# Patient Record
Sex: FEMALE | Race: WHITE | Employment: OTHER | URBAN - METROPOLITAN AREA
[De-identification: names, ages, dates, MRNs, and addresses within clinical notes are randomized per-mention and may not be internally consistent; named-entity substitution may affect disease eponyms.]

---

## 2020-11-18 ENCOUNTER — APPOINTMENT (OUTPATIENT)
Dept: CT IMAGING | Age: 85
End: 2020-11-18
Payer: MEDICARE

## 2020-11-18 ENCOUNTER — APPOINTMENT (OUTPATIENT)
Dept: GENERAL RADIOLOGY | Age: 85
End: 2020-11-18
Payer: MEDICARE

## 2020-11-18 ENCOUNTER — HOSPITAL ENCOUNTER (EMERGENCY)
Age: 85
Discharge: HOME | End: 2020-11-18
Attending: EMERGENCY MEDICINE
Payer: MEDICARE

## 2020-11-18 VITALS
OXYGEN SATURATION: 95 % | DIASTOLIC BLOOD PRESSURE: 70 MMHG | HEART RATE: 94 BPM | TEMPERATURE: 97.7 F | RESPIRATION RATE: 18 BRPM | SYSTOLIC BLOOD PRESSURE: 143 MMHG

## 2020-11-18 PROCEDURE — 72192 CT PELVIS W/O DYE: CPT

## 2020-11-18 PROCEDURE — 73502 X-RAY EXAM HIP UNI 2-3 VIEWS: CPT

## 2020-11-18 PROCEDURE — 73610 X-RAY EXAM OF ANKLE: CPT

## 2020-11-18 PROCEDURE — 99283 EMERGENCY DEPT VISIT LOW MDM: CPT

## 2020-11-18 RX ORDER — ATORVASTATIN CALCIUM 80 MG/1
TABLET, FILM COATED ORAL
COMMUNITY

## 2020-11-18 RX ORDER — FOLIC ACID 1 MG/1
TABLET ORAL 2 TIMES DAILY
COMMUNITY

## 2020-11-18 RX ORDER — SPIRONOLACTONE 25 MG/1
TABLET ORAL
COMMUNITY

## 2020-11-18 RX ORDER — FUROSEMIDE 40 MG/1
TABLET ORAL
COMMUNITY

## 2020-11-18 RX ORDER — LEVOTHYROXINE SODIUM 88 UG/1
TABLET ORAL
COMMUNITY

## 2020-11-18 RX ORDER — METOPROLOL SUCCINATE 25 MG/1
25 TABLET, EXTENDED RELEASE ORAL DAILY
COMMUNITY

## 2020-11-18 RX ORDER — TRAMADOL HYDROCHLORIDE 50 MG/1
50-100 TABLET ORAL EVERY 8 HOURS PRN
Qty: 12 TABLET | Refills: 0 | Status: SHIPPED | OUTPATIENT
Start: 2020-11-18 | End: 2020-11-23

## 2020-11-19 NOTE — ED NOTES
Patient states she was getting out of elevator and rolled her right ankle. Patient states she did not fall and denied any dizziness or lightheadedness. Patient reports pain in her right groin after event. Denies any pain in her right ankle. Right leg is warm, pink, and no external signs of injury noted. Pulses are present. +1 edema noted to bilateral lower extremities. Respiratory assessment WNL. Patient denies any numbness or tingling. Cap refills in RLE less than 3 seconds. Patient is alert and oriented and able to follow commands.       Tomás Dominique RN  11/18/20 3711

## 2020-11-19 NOTE — ED PROVIDER NOTES
[id number] [facility] ED  [address] [facility] [address]  Phone: [phone]  Fax: [phone]      eMERGENCY dEPARTMENT eNCOUnter      Pt Name: Caleb Cormier  MRN: [id number]  [place] 1935  Date of evaluation: 11/18/20      CHIEF COMPLAINT:  Chief Complaint   Patient presents with    Groin Pain     RIGHT       HISTORY OF PRESENT ILLNESS    Caleb Cormier is a 82 y.o. female who presents with evaluation for orthopedic pain:    Location/Symptom:   R hip and ankle pain  Timing/Onset:  3 hrs  Context/Setting:    Pt was in elevator and twisted her right ankle, causing her to buckle a little and she caught herself on the elevator hand rail. No fall. She reports initial right ankle pain and some milder hip pain. Ankle pain much better but right hip/thigh pain persistent and increasing pain with weightbearing. No paresthesias/focal weakness. No associated bl-b incont/abd pain. No orthopedic history of this leg reported. She took some Tylenol earlier prior to arrival.     Quality:   Achy, sharp  Duration:   constant  Modifying Factors: Worse with movement and weightbearing, better with rest  Severity:   Moderate    Nursing Notes were reviewed. REVIEW OF SYSTEMS       Constitutional: Denies recent fever, chills. Eyes: No vision changes. Neck: No neck pain. Respiratory: Denies recent shortness of breath. Cardiac:  Denies recent chest pain. GI:  Denies abdominal pain/nausea/vomiting/diarrhea. : Denies dysuria. Musculoskeletal:   Per HPI  Neurologic:  No headache. No focal weakness. No paresthesias. Skin:  Denies any rash. Negative in 10 essential Systems except as mentioned above and in the HPI. PAST MEDICAL HISTORY   PMH:  has a past medical history of Heart attack ([address] and Hypothyroidism. Surgical History:  has a past surgical history that includes Cardiac surgery; fracture surgery (Left); and Hysterectomy. Social History:  reports that she has never smoked.  She Tylenol a few hrs ago. Suspect more muscular sprain/strain but getting some XR. Normal active/passive ROM of hip w/o significant findings of concern. 1950  Getting CT of Pelvis as we have suspicion of right rami fracture on XR. Pt agreeable. 2038   Attending and I have reviewed the disposition diagnosis with the patient and or their family/guardian. Pelvic fractures discussed and NWB status reiterated. Family is trying to secure a walker tonight for her to use. She is an established [facility] patient so they will f/u with that office tomorrow for scheduling. We will provide Ultram rx as she really doesn't want to take anything other than Tylenol for her pain. We have answered their questions and given discharge instructions. They voiced understanding of these instructions and did not have any further questions or complaints. Attending is on phone right now discussing her injuries with her son who is a Physician at [facility]. Attending asked if we can send images over to [facility] for his review. Discharges    Orders Placed This Encounter   Medications    traMADol (ULTRAM) 50 MG tablet     Sig: Take 1-2 tablets by mouth every 8 hours as needed for Pain for up to 5 days. Dispense:  12 tablet     Refill:  0       CONSULTS:  None      FINAL IMPRESSION      1. Multiple closed fractures of pelvis with disruption of pelvic ring, initial encounter [facility])          DISPOSITION/PLAN:  DISPOSITION          PATIENT REFERRED TO:  [address]  [phone]    Schedule an appointment as soon as possible for a visit   For fracture management     [facility]  [address] [place] [address]  [phone]  Go to   for worsening of your symptoms      DISCHARGE MEDICATIONS:  New Prescriptions    TRAMADOL (ULTRAM) 50 MG TABLET    Take 1-2 tablets by mouth every 8 hours as needed for Pain for up to 5 days.        (Please note that portions of this note were completed with a voice recognition program.  Efforts were made to edit the dictations but occasionally words are mis-transcribed.)    DEBBIE Ayala PA-C  11/18/20 2048

## 2020-11-19 NOTE — ED PROVIDER NOTES
[id number] [facility] ED  [address] [facility] [address]  Phone: [phone]  Fax: [phone]      Attending Physician Attestation    I performed a history and physical examination of the patient and discussed management with the mid level provider. I reviewed the mid level provider's note and agree with the documented findings and plan of care. Any areas of disagreement are noted on the chart. I was personally present for the key portions of any procedures. I have documented in the chart those procedures where I was not present during the key portions. I have reviewed the emergency nurses triage note. I agree with the chief complaint, past medical history, past surgical history, allergies, medications, social and family history as documented unless otherwise noted below. Documentation of the HPI, Physical Exam and Medical Decision Making performed by mid level providers is based on my personal performance of the HPI, PE and MDM. For Physician Assistant/ Nurse Practitioner cases/documentation I have personally evaluated this patient and have completed at least one if not all key elements of the E/M (history, physical exam, and MDM). Additional findings are as noted. CHIEF COMPLAINT       Chief Complaint   Patient presents with    Groin Pain     RIGHT         HISTORY OF PRESENT ILLNESS    Sarah Frey is a 82 y.o. female who presents who twisted her right ankle and then noted pelvic pain. Dates the pain has been localized with weightbearing and to the right groin region. No direct fall is noted. Distal neurovascular complaints      PAST MEDICAL HISTORY    has a past medical history of Heart attack ([address] and Hypothyroidism. SURGICAL HISTORY      has a past surgical history that includes Cardiac surgery; fracture surgery (Left); and Hysterectomy.     CURRENT MEDICATIONS       Previous Medications    ATORVASTATIN (LIPITOR) 80 MG TABLET    1 tablet    FOLIC ACID (FOLVITE) 1 MG TABLET TAKE 1 TABLET BY MOUTH TWICE DAILY    FOLIC ACID (FOLVITE) 1 MG TABLET    2 times daily    FUROSEMIDE (LASIX) 40 MG TABLET    1 tablet    LEVOTHYROXINE (SYNTHROID) 88 MCG TABLET    1 tablet in the morning on an empty stomach    METOPROLOL SUCCINATE (TOPROL XL) 25 MG EXTENDED RELEASE TABLET    Take 25 mg by mouth daily    SPIRONOLACTONE (ALDACTONE) 25 MG TABLET    1/2 tab po daily       ALLERGIES     is allergic to pcn [penicillins] and shellfish-derived products. FAMILY HISTORY     has no family status information on file. family history is not on file. SOCIAL HISTORY      reports that she has never smoked. She has never used smokeless tobacco.    PHYSICAL EXAM     INITIAL VITALS:  oral temperature is 97.7 °F (36.5 °C). Her blood pressure is 143/70 (abnormal) and her pulse is 94. Her respiration is 18 and oxygen saturation is 95%. There is some tenderness with range of motion of the right hip no visible signs of trauma or shortening or external rotation the ankle appears to be intact. DIAGNOSTIC RESULTS     EKG: All EKG's are interpreted by the Emergency Department Physician who either signs or Co-signs this chart in the absence of a cardiologist.        RADIOLOGY:   Non-plain film images such as CT, Ultrasound and MRI are read by the radiologist. [facility] radiographic images are visualized and the radiologist interpretations are reviewed as follows:     Pelvic fracture on the right side x2    LABS:  No results found for this visit on 11/18/20.         EMERGENCY DEPARTMENT COURSE:   Vitals:    Vitals:    11/18/20 1842 11/18/20 2009   BP: (!) 134/56 (!) 143/70   Pulse: 76 94   Resp: 16 18   Temp: 97.7 °F (36.5 °C)    TempSrc: Oral    SpO2: 94% 95%     -------------------------  BP: (!) 143/70, Temp: 97.7 °F (36.5 °C), Pulse: 94, Resp: 18      PERTINENT ATTENDING PHYSICIAN COMMENTS:          (Please note that portions of this note were completed with a voice recognition program.  Efforts were made to

## 2024-01-11 ENCOUNTER — APPOINTMENT (OUTPATIENT)
Dept: VASCULAR LAB | Age: 89
End: 2024-01-11
Attending: NURSE PRACTITIONER
Payer: MEDICARE

## 2024-01-11 ENCOUNTER — APPOINTMENT (OUTPATIENT)
Dept: GENERAL RADIOLOGY | Age: 89
End: 2024-01-11
Payer: MEDICARE

## 2024-01-11 ENCOUNTER — HOSPITAL ENCOUNTER (EMERGENCY)
Age: 89
Discharge: HOME | End: 2024-01-11
Attending: STUDENT IN AN ORGANIZED HEALTH CARE EDUCATION/TRAINING PROGRAM
Payer: MEDICARE

## 2024-01-11 VITALS
SYSTOLIC BLOOD PRESSURE: 145 MMHG | HEART RATE: 77 BPM | TEMPERATURE: 97.6 F | WEIGHT: 152 LBS | RESPIRATION RATE: 16 BRPM | BODY MASS INDEX: 34.19 KG/M2 | OXYGEN SATURATION: 98 % | HEIGHT: 56 IN | DIASTOLIC BLOOD PRESSURE: 55 MMHG

## 2024-01-11 DIAGNOSIS — S92.351A CLOSED DISPLACED FRACTURE OF FIFTH METATARSAL BONE OF RIGHT FOOT, INITIAL ENCOUNTER: Primary | ICD-10-CM

## 2024-01-11 LAB — ECHO BSA: 1.65 M2

## 2024-01-11 PROCEDURE — 73630 X-RAY EXAM OF FOOT: CPT

## 2024-01-11 PROCEDURE — 99284 EMERGENCY DEPT VISIT MOD MDM: CPT

## 2024-01-11 PROCEDURE — 73610 X-RAY EXAM OF ANKLE: CPT

## 2024-01-11 PROCEDURE — 93971 EXTREMITY STUDY: CPT | Performed by: SURGERY

## 2024-01-11 PROCEDURE — 93971 EXTREMITY STUDY: CPT

## 2024-01-11 RX ORDER — LEVOCETIRIZINE DIHYDROCHLORIDE 5 MG/1
5 TABLET, FILM COATED ORAL NIGHTLY
COMMUNITY

## 2024-01-11 ASSESSMENT — PAIN - FUNCTIONAL ASSESSMENT: PAIN_FUNCTIONAL_ASSESSMENT: NONE - DENIES PAIN

## 2024-01-11 ASSESSMENT — ENCOUNTER SYMPTOMS
ABDOMINAL PAIN: 0
SORE THROAT: 0
COUGH: 0
NAUSEA: 0
SHORTNESS OF BREATH: 0
BACK PAIN: 0
DIARRHEA: 0
VOMITING: 0

## 2024-01-11 NOTE — ED PROVIDER NOTES
interpreted by the Emergency Department Physician who either signs or Co-signs this chart in the absence of a cardiologist.        RADIOLOGY:   Non-plain film images such as CT, Ultrasound and MRI are read by the radiologist. Plain radiographic images are visualized and preliminarily interpreted by the emergency physician with the below findings:        Interpretation per the Radiologist below, if available at the time of this note:    No orders to display         ED BEDSIDE ULTRASOUND:   Performed by ED Physician - none    LABS:  Labs Reviewed - No data to display    All other labs were within normal range or not returned as of this dictation.    EMERGENCY DEPARTMENT COURSE and DIFFERENTIAL DIAGNOSIS/MDM:   Vitals:    Vitals:    01/11/24 1537   BP: (!) 145/55   Pulse: 77   Resp: 16   Temp: 97.6 °F (36.4 °C)   TempSrc: Oral   SpO2: 98%   Weight: 68.9 kg (152 lb)   Height: 1.422 m (4' 8\")           Medical Decision Making  This is a nontoxic-appearing 88-year-old female presenting with female family member for evaluation of right lower extremity ankle foot pain swelling, on December 17, 2023 the patient reports that she was pulling a Pan Out Of the Oven It Was Not Hot, the Pan Dropped on Her Foot, She Said the Pain Has Mostly Improved but They Were Concerned Because of the Swelling Persisting for Possible DVT Prompting Him to Come to the Emergency Department for Evaluation.  The Patient Is Not Anticoagulated No Previous History of DVTs or PEs.    Patient was offered analgesic medication while in the department, declined politely states that the pain is not that bad they just want to figure out what is causing the swelling.    Right lower extremity venous Doppler was ordered, right foot and right ankle x-ray ordered.    Amount and/or Complexity of Data Reviewed  Radiology: ordered.            REASSESSMENT          CRITICAL CARE TIME   Total Critical Care time was  minutes, excluding separately reportable

## 2024-01-11 NOTE — ED PROVIDER NOTES
[facility] EMERGENCY DEPARTMENT  EMERGENCY DEPARTMENT ENCOUNTER  INDEPENDENT ATTESTATION         1. Closed displaced fracture of fifth metatarsal bone of right foot, initial encounter          Pt Name: Catia Hayes  MRN: [id number]  Birthdate 1935  Date of evaluation: 1/15/24    Catia Hayes is a 88 y.o. female who presents with Ankle Pain (Pt arrives with co ankle injury approx 1 mth ago. Pt states she dropped an object on it and was all black and blue and swollen. Pt has not been evaluated for the injury )  .    Based on the medical record, the care appears appropriate. I was personally available for consultation in the Emergency Department.      FINAL IMPRESSION      1. Closed displaced fracture of fifth metatarsal bone of right foot, initial encounter          DISPOSITION / PLAN     DISPOSITION Decision To Discharge 01/11/2024 05:49:54 PM      PATIENT REFERRED TO:  Daniel Moraes DPM  [address] [address]  [phone]    Schedule an appointment as soon as possible for a visit   Update that you were in the ER at [place], call and schedule appointment for follow up      DISCHARGE MEDICATIONS:  Discharge Medication List as of 1/11/2024  5:54 PM          Dr. Baylee Hendricks,    Attending Emergency Physician       Baylee Hendricks DO  01/15/24 0901     Medications:  Prior to Admission medications    Medication Sig Start Date End Date Taking? Authorizing Provider   levocetirizine (XYZAL) 5 MG tablet Take 1 tablet by mouth nightly   Yes Negin Sepulveda MD   levothyroxine (SYNTHROID) 88 MCG tablet 1 tablet in the morning on an empty stomach    Negin Sepulveda MD   spironolactone (ALDACTONE) 25 MG tablet 1/2 tab po daily    Negin Sepulveda MD   furosemide (LASIX) 40 MG tablet 1 tablet    Negin Sepulveda MD   atorvastatin (LIPITOR) 80 MG tablet 1 tablet    Negin Sepulveda MD   metoprolol succinate (TOPROL XL) 25 MG extended release tablet Take 1 tablet by mouth daily    Negin Sepulveda MD   folic acid (FOLVITE) 1 MG tablet 2 times daily    Negin Sepulveda MD   folic acid (FOLVITE) 1 MG tablet TAKE 1 TABLET BY MOUTH TWICE DAILY 5/18/16   Elaine Romero APRN - CNP         Vitals:   Vitals:    01/11/24 1537   BP: (!) 145/55   Pulse: 77   Resp: 16   Temp: 97.6 °F (36.4 °C)   TempSrc: Oral   SpO2: 98%   Weight: 68.9 kg (152 lb)   Height: 1.422 m (4' 8\")              PHYSICAL EXAM:    Constitutional: Awake, alert, answering questions appropriately, non-toxic, non-lethargic ***    HEENT: Head is atraumatic, conjunctiva non-injected, normal nose present    Neck/Back: Moving neck freely on my examination, no meningeal signs present    Chest: inspection is normal, no outward signs of trauma, no crepitous on palpation, no tenderness on palpation    Cardiovascular: Heart sounds are present, normal S1 and S2, radial pulses are palpable and 2+ bilaterally     Respiratory: Breath sounds are present bilaterally, even unlabored breathing, no acute respiratory distress, no supplemental oxygen support in place ***    Abdomen: Soft, non-distended, non-peritoneal, no tenderness to palpation of all 4 quadrants or suprapubically     Skin: Warm, dry, intact    Neuro: Awake, alert, answering questions appropriately, moving all 4 extremities

## 2024-01-11 NOTE — DISCHARGE INSTRUCTIONS
Please wear walking boot while up walking around.  Keep foot elevated at rest to help with swelling.  Ice pack therapy as tolerated to help with swelling or pain.    Tylenol over-the-counter as directed to help with pain.    Return to the ER: Fevers, redness warmth or worsening swelling to the foot or ankle, worsening pain, inability to ambulate, numbness, pale color to the foot; or any other concerning symptoms.

## 2025-01-21 ENCOUNTER — APPOINTMENT (OUTPATIENT)
Dept: CT IMAGING | Age: 89
End: 2025-01-21
Payer: MEDICARE

## 2025-01-21 ENCOUNTER — HOSPITAL ENCOUNTER (EMERGENCY)
Age: 89
Discharge: HOME | End: 2025-01-21
Attending: EMERGENCY MEDICINE
Payer: MEDICARE

## 2025-01-21 ENCOUNTER — APPOINTMENT (OUTPATIENT)
Dept: GENERAL RADIOLOGY | Age: 89
End: 2025-01-21
Payer: MEDICARE

## 2025-01-21 VITALS
RESPIRATION RATE: 18 BRPM | BODY MASS INDEX: 35.71 KG/M2 | SYSTOLIC BLOOD PRESSURE: 134 MMHG | WEIGHT: 154.32 LBS | OXYGEN SATURATION: 96 % | HEIGHT: 55 IN | TEMPERATURE: 97.8 F | HEART RATE: 102 BPM | DIASTOLIC BLOOD PRESSURE: 70 MMHG

## 2025-01-21 DIAGNOSIS — S50.02XA CONTUSION OF LEFT ELBOW, INITIAL ENCOUNTER: ICD-10-CM

## 2025-01-21 DIAGNOSIS — S80.02XA CONTUSION OF LEFT KNEE, INITIAL ENCOUNTER: ICD-10-CM
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
## 2025-01-21 LAB
ANION GAP SERPL CALCULATED.3IONS-SCNC: 17 MMOL/L (ref 9–17)
BASOPHILS # BLD: 0.01 K/UL (ref 0–0.2)
BASOPHILS NFR BLD: 0 % (ref 0–2)
BUN SERPL-MCNC: 31 MG/DL (ref 8–23)
CALCIUM SERPL-MCNC: 9.7 MG/DL (ref 8.6–10.4)
CHLORIDE SERPL-SCNC: 103 MMOL/L (ref 98–107)
CO2 SERPL-SCNC: 20 MMOL/L (ref 20–31)
CREAT SERPL-MCNC: 0.8 MG/DL (ref 0.5–0.9)
EOSINOPHIL # BLD: 0 K/UL (ref 0–0.4)
EOSINOPHILS RELATIVE PERCENT: 0 % (ref 1–4)
ERYTHROCYTE [DISTWIDTH] IN BLOOD BY AUTOMATED COUNT: 15.1 % (ref 12.5–15.4)
GFR, ESTIMATED: 71 ML/MIN/1.73M2
GLUCOSE SERPL-MCNC: 124 MG/DL (ref 70–99)
HCT VFR BLD AUTO: 42 % (ref 36–46)
HGB BLD-MCNC: 13.8 G/DL (ref 12–16)
LYMPHOCYTES NFR BLD: 0.52 K/UL (ref 1–4.8)
LYMPHOCYTES RELATIVE PERCENT: 7 % (ref 24–44)
MCH RBC QN AUTO: 29.1 PG (ref 26–34)
MCHC RBC AUTO-ENTMCNC: 32.9 G/DL (ref 31–37)
MCV RBC AUTO: 88.4 FL (ref 80–100)
MONOCYTES NFR BLD: 0.85 K/UL (ref 0.1–1.2)
MONOCYTES NFR BLD: 11 % (ref 2–11)
NEUTROPHILS NFR BLD: 82 % (ref 36–66)
NEUTS SEG NFR BLD: 6.27 K/UL (ref 1.8–7.7)
PLATELET # BLD AUTO: 178 K/UL (ref 140–450)
PMV BLD AUTO: 10.5 FL (ref 8–14)
POTASSIUM SERPL-SCNC: 3.8 MMOL/L (ref 3.7–5.3)
RBC # BLD AUTO: 4.75 M/UL (ref 4–5.2)
SODIUM SERPL-SCNC: 140 MMOL/L (ref 135–144)
WBC OTHER # BLD: 7.7 K/UL (ref 3.5–11)

## 2025-01-21 PROCEDURE — 36415 COLL VENOUS BLD VENIPUNCTURE: CPT

## 2025-01-21 PROCEDURE — 85025 COMPLETE CBC W/AUTO DIFF WBC: CPT

## 2025-01-21 PROCEDURE — 80048 BASIC METABOLIC PNL TOTAL CA: CPT

## 2025-01-21 PROCEDURE — 70450 CT HEAD/BRAIN W/O DYE: CPT

## 2025-01-21 PROCEDURE — 73080 X-RAY EXAM OF ELBOW: CPT

## 2025-01-21 PROCEDURE — 73562 X-RAY EXAM OF KNEE 3: CPT

## 2025-01-21 PROCEDURE — 99284 EMERGENCY DEPT VISIT MOD MDM: CPT

## 2025-01-21 ASSESSMENT — PAIN - FUNCTIONAL ASSESSMENT: PAIN_FUNCTIONAL_ASSESSMENT: NONE - DENIES PAIN

## 2025-01-21 NOTE — DISCHARGE INSTRUCTIONS
Please clean the scrapes daily with soap and water then dress the scrapes on the knee and elbow with neosporin or vaseline daily. Please use the ace bandage for compression of the knee as long as it is comfortable. Please follow up with your primary care provider for close re-evaluation.

## 2025-01-21 NOTE — ED PROVIDER NOTES
[facility] Emergency Department  [address]  [facility] [address]  Phone: [phone]  Fax: [phone]      Attending Physician Attestation    Based on the medical record, the care appears appropriate. I was personally available for consultation in the Emergency Department. I did have a discussion with our midlevel provider regarding the care of this patient.  I reviewed the mid level provider's note and agree with the documented findings and plan of care.   I have reviewed the emergency nurses triage note. I agree with the chief complaint, past medical history, past surgical history, allergies, medications, social and family history as documented unless otherwise noted below.       CHIEF COMPLAINT       Chief Complaint   Patient presents with    Fall     Stumble and fall in bedroom this morning.  Scattered abrasions to arms and bruising to left, lower leg.  Unknown down time.          PAST MEDICAL HISTORY    has a past medical history of Heart attack (HCC) and Hypothyroidism.    SURGICAL HISTORY      has a past surgical history that includes Cardiac surgery; fracture surgery (Left); and Hysterectomy.    CURRENT MEDICATIONS       Previous Medications    ATORVASTATIN (LIPITOR) 80 MG TABLET    1 tablet    FOLIC ACID (FOLVITE) 1 MG TABLET    TAKE 1 TABLET BY MOUTH TWICE DAILY    FOLIC ACID (FOLVITE) 1 MG TABLET    2 times daily    FUROSEMIDE (LASIX) 40 MG TABLET    1 tablet    LEVOCETIRIZINE (XYZAL) 5 MG TABLET    Take 1 tablet by mouth nightly    LEVOTHYROXINE (SYNTHROID) 88 MCG TABLET    1 tablet in the morning on an empty stomach    METOPROLOL SUCCINATE (TOPROL XL) 25 MG EXTENDED RELEASE TABLET    Take 1 tablet by mouth daily    SPIRONOLACTONE (ALDACTONE) 25 MG TABLET    1/2 tab po daily       ALLERGIES     is allergic to pcn [penicillins] and shellfish-derived products.      (Please note that portions of this note were completed with a voice recognition program.  Efforts were made to edit the 
Lymphocytes % 7 (L) 24 - 44 %    Monocytes % 11 2 - 11 %    Eosinophils % 0 (L) 1 - 4 %    Basophils % 0 0 - 2 %    Neutrophils Absolute 6.27 1.8 - 7.7 k/uL    Lymphocytes Absolute 0.52 (L) 1.0 - 4.8 k/uL    Monocytes Absolute 0.85 0.1 - 1.2 k/uL    Eosinophils Absolute 0.00 0.0 - 0.4 k/uL    Basophils Absolute 0.01 0.0 - 0.2 k/uL       Consults:  none    Procedures:  none    Re-Evaluation & Disposition:  See ED Course notes above.    The patient and/or family and I have discussed the diagnosis and risks, and we agree with discharging home to follow-up with their pertinent providers. The patient appears stable for discharge and has been instructed to return immediately for new concerning symptoms or if the symptoms worsen in any way. The patient understands that at this time there is no evidence for a more malignant underlying process, but the patient also understands that early in the process of an illness or injury, an emergency department workup can be falsely reassuring. Routine discharge counseling was given, and the patient understands that worsening, changing or persistent symptoms should prompt an immediate call or follow up with their primary physician or return to the emergency department.    I have reviewed the disposition diagnosis with the patient and or their family/guardian. I have answered their questions and given discharge instructions. They voiced understanding of these instructions and did not have any further questions or complaints.     This patient was seen by the attending physician and they agreed with the assessment and plan.       FINAL IMPRESSION      1. Fall from standing, initial encounter    2. Contusion of left elbow, initial encounter    3. Contusion of left knee, initial encounter          DISPOSITION / PLAN     Disposition Decision To Discharge    Patient Refferred to:  Erinn Guerin MD  [address] [address]    Call in 1 day  to arrange for close follow